# Patient Record
Sex: FEMALE | NOT HISPANIC OR LATINO | Employment: FULL TIME
[De-identification: names, ages, dates, MRNs, and addresses within clinical notes are randomized per-mention and may not be internally consistent; named-entity substitution may affect disease eponyms.]

---

## 2023-12-04 ENCOUNTER — TELEPHONE (OUTPATIENT)
Dept: HEMATOLOGY/ONCOLOGY | Facility: HOSPITAL | Age: 38
End: 2023-12-04

## 2023-12-04 DIAGNOSIS — R23.3 EASY BRUISING: Primary | ICD-10-CM

## 2023-12-05 ENCOUNTER — CLINICAL DOCUMENTATION (OUTPATIENT)
Dept: HEMATOLOGY/ONCOLOGY | Age: 38
End: 2023-12-05

## 2024-05-02 ENCOUNTER — OFFICE VISIT (OUTPATIENT)
Dept: OBGYN CLINIC | Facility: CLINIC | Age: 39
End: 2024-05-02
Payer: COMMERCIAL

## 2024-05-02 VITALS
DIASTOLIC BLOOD PRESSURE: 70 MMHG | BODY MASS INDEX: 33.18 KG/M2 | WEIGHT: 224 LBS | HEIGHT: 69 IN | SYSTOLIC BLOOD PRESSURE: 122 MMHG

## 2024-05-02 DIAGNOSIS — N93.9 ABNORMAL UTERINE BLEEDING: Primary | ICD-10-CM

## 2024-05-02 DIAGNOSIS — R23.3 EASY BRUISING: ICD-10-CM

## 2024-05-02 DIAGNOSIS — F32.81 PMDD (PREMENSTRUAL DYSPHORIC DISORDER): ICD-10-CM

## 2024-05-02 PROCEDURE — 99204 OFFICE O/P NEW MOD 45 MIN: CPT | Performed by: OBSTETRICS & GYNECOLOGY

## 2024-05-02 RX ORDER — FLUOXETINE HYDROCHLORIDE 20 MG/1
20 CAPSULE ORAL DAILY
Qty: 120 CAPSULE | Refills: 3 | Status: SHIPPED | OUTPATIENT
Start: 2024-05-02

## 2024-05-02 RX ORDER — AZELASTINE HYDROCHLORIDE 0.5 MG/ML
1 SOLUTION/ DROPS OPHTHALMIC 2 TIMES DAILY
COMMUNITY
Start: 2023-12-08

## 2024-05-02 RX ORDER — SULFACETAMIDE SODIUM, SULFUR 98; 48 MG/G; MG/G
LIQUID TOPICAL
COMMUNITY
Start: 2024-02-19

## 2024-05-02 RX ORDER — FLUOXETINE HYDROCHLORIDE 20 MG/1
CAPSULE ORAL
COMMUNITY
Start: 2024-05-01 | End: 2024-05-02

## 2024-05-02 NOTE — PROGRESS NOTES
New Patient GYN History and Physical  [facility]    CHIEF COMPLAINT:    Chief Complaint   Patient presents with    Menstrual Problem     Pt states some days can't work, she's bleeding tissue not a lot of liquid, experiencing some pms; bloating, constipation, hormonal acne    Vaginal Problem     Pt states some break out on her labia and regarding the bruising and seen a doctor who told her that they would like for her to see a blood dr. or it may be related to endometriosis, bruising is always more on the left upper thigh and lower pelvic area      HISTORY OF PRESENT ILLNESS:   Kalie Clinton is a 38 year old female   who presents for      Had bruising / brakout on labia.  First noted a few years ago.    Her wax lady told her it wasn't ingrown hair - it was a breakout.  Dermatologist said it was a mole.  Has picture - a bruise near pubic bone, was not painful. 2022.  Had bruises along inner thigh, left side. Sometimes gets it on right side also.     was mild, 2023 was more intense / dark bruising / petechiae   another bad one.    [facility], he gave referral to blood doctor and check labs.  Bloodwork x 2 was normal (1 and 2 years ago.)    One doc said the prozac makes more prone to bruising.  Has not seen a blood specialist.        Patient's last menstrual period was 2024 (exact date).  Having a lot of breakthrough bleeding  Had some bleeding sat/sun then none for 2 days, then had some bleeding today. So is expecting full period bleeding in the next 1-2 days.    6 months ago, had 2 heavy periods close to each other.  Used to get period end of month, and is now at the beginning.    Stopped birth control  - was pretty regular until that last year  Periods and PMS symptoms are getting more intense - can feel the emotional fluctuations and worse hormonal acne.    Mother had 5 children - then had hysterectomy in mid-30s.  Older sister is 44 and  feels like cindy-menopause now.      + sex with woman, no bleeding after penetration or orgasm.    Last pap smear: 2023 normal, no h/o abnormals  No h/o STI      PAST MEDICAL HISTORY:   Past Medical History:    Acne    Depression        PAST SURGICAL HISTORY:   Past Surgical History:   Procedure Laterality Date    [place] teeth removed          PAST OB HISTORY:  OB History    Para Term  AB Living   0 0 0 0 0 0   SAB IAB Ectopic Multiple Live Births   0 0 0 0 0       CURRENT MEDICATIONS:      Current Outpatient Medications:     PLEXION CLEANSER 9.8-4.8 % External Liquid, Let sit for 3-5 minutes before washing off every morning, Disp: , Rfl:     Azelastine HCl 0.05 % Ophthalmic Solution, Apply 1 drop to eye 2 (two) times daily., Disp: , Rfl:     FLUoxetine 20 MG Oral Cap, Take 1 capsule (20 mg total) by mouth daily. Take 2 pills per day for the 10 days leading into your period., Disp: 120 capsule, Rfl: 3    ALLERGIES:  Allergies   Allergen Reactions    Penicillins NAUSEA AND VOMITING       SOCIAL HISTORY:  Social History     Socioeconomic History    Marital status: Single   Tobacco Use    Smoking status: Never    Smokeless tobacco: Never   Substance and Sexual Activity    Alcohol use: Yes     Comment: 3 x week    Drug use: Not Currently    Sexual activity: Yes     Partners: Female   Other Topics Concern    Blood Transfusions No       FAMILY HISTORY:  Family History   Problem Relation Age of Onset    Other (hysterectomy) Mother     Other (colorectal cancer) Mother         was a melanoma    Diabetes Mother     Cancer Maternal Grandmother     Other (uterine tumor) Sister     Other (dermoid cyst) Sister     No Known Problems Sister     No Known Problems Sister     No Known Problems Sister      ASSESSMENTS:  PHYSICAL EXAM:   Patient's last menstrual period was 2024 (exact date).   Vitals:    24 1357   BP: 122/70   Weight: 224 lb (101.6 kg)   Height: 69\"     CONSTITUTIONAL: Awake, alert,  cooperative, no apparent distress, and appears stated age   NECK: Supple, symmetrical, trachea midline, no adenopathy, thyroid symmetric, not enlarged and no tenderness  LUNGS: No excess work of breathing  ABDOMEN: Soft, non-distended, non-tender, no masses palpated    GENITAL/URINARY:    External Genitalia:  General appearance; normal, Hair distribution; normal, Lesions absent   Urethral Meatus:  Lesions absent, Prolapse absent  Bladder:  Tenderness absent, Cystocele absent  Vagina:  Discharge absent, Lesions absent, Pelvic support normal  Cervix:  Lesions absent, Discharge absent, Tenderness absent  Uterus:  Size normal, Masses absent, Tenderness absent  Adnexa:  Masses absent, Tenderness absent  Anus/Perineum:  Lesions absent    MUSCULOSKELETAL: There is no redness, warmth, or swelling of the joints. Tone is normal.  NEUROLOGIC: Patient is awake, alert and oriented to name, place and time. Casual gait is normal.  SKIN: no bruising or bleeding and no rashes  PSYCHIATRIC: Behavior:  Appropriate  Mood:  appropriate  ASSESSMENT AND PLAN:  1. Abnormal uterine bleeding  - US to eval structure of  uterus. Labs to eval for PCOS and/or perimenopause.  - Pelvic US Complete [facility] Only [[id number]]; Future  - LH (Luteinizing Hormone); Future  - FSH; Future  - Estradiol; Future  - Testosterone,Total, Free, and Weak Binding plus Sex Hormone Binding Globulin; Future    2. Easy bruising  - recommend checking CBC around period to eval for thrombocytopenia in that setting. Would also recommend at least 2 occasions to solidify any trends.  - especially if these labs are normal, would recommend hematology evaluation. Referral place for Dr. Prater.  - CBC, Platelet; No Differential; Standing  - Oncology/Hematology Referral - In Network    3. PMDD (premenstrual dysphoric disorder)  - increase dose of prozac during week prior to menses (total of 10 days per month of 40 mg daily.)  - refills sent with enough pills to allow the double dose  x 10 days.      Medications    PLEXION CLEANSER 9.8-4.8 % External Liquid    Azelastine HCl 0.05 % Ophthalmic Solution    FLUoxetine 20 MG Oral Cap       follow up as needed  Laura Aparicio,

## 2024-06-24 ENCOUNTER — TELEPHONE (OUTPATIENT)
Dept: OBGYN CLINIC | Facility: CLINIC | Age: 39
End: 2024-06-24

## 2024-06-26 ENCOUNTER — TELEPHONE (OUTPATIENT)
Dept: OBGYN CLINIC | Facility: CLINIC | Age: 39
End: 2024-06-26

## 2024-06-26 NOTE — TELEPHONE ENCOUNTER
Called patient and left VM regarding overdue lab work placed by . let pt know that she is able to walk-in mon-Friday from 8am-4pm to get labs done.

## 2024-11-19 ENCOUNTER — TELEPHONE (OUTPATIENT)
Dept: OTHER | Age: 39
End: 2024-11-19

## 2024-11-19 DIAGNOSIS — R23.3 EASY BRUISING: Primary | ICD-10-CM

## 2025-07-02 ENCOUNTER — TELEPHONE (OUTPATIENT)
Dept: GASTROENTEROLOGY | Age: 40
End: 2025-07-02

## 2025-07-02 ENCOUNTER — TELEPHONE (OUTPATIENT)
Dept: OTHER | Age: 40
End: 2025-07-02

## 2025-07-02 DIAGNOSIS — Z12.11 COLON CANCER SCREENING: Primary | ICD-10-CM
